# Patient Record
Sex: FEMALE
[De-identification: names, ages, dates, MRNs, and addresses within clinical notes are randomized per-mention and may not be internally consistent; named-entity substitution may affect disease eponyms.]

---

## 2017-02-16 ENCOUNTER — MEDICATION RENEWAL (OUTPATIENT)
Age: 37
End: 2017-02-16

## 2017-02-17 ENCOUNTER — MEDICATION RENEWAL (OUTPATIENT)
Age: 37
End: 2017-02-17

## 2017-07-17 ENCOUNTER — APPOINTMENT (OUTPATIENT)
Dept: ORTHOPEDIC SURGERY | Facility: CLINIC | Age: 37
End: 2017-07-17

## 2017-07-17 VITALS
BODY MASS INDEX: 40.75 KG/M2 | HEART RATE: 86 BPM | DIASTOLIC BLOOD PRESSURE: 79 MMHG | WEIGHT: 230 LBS | HEIGHT: 63 IN | SYSTOLIC BLOOD PRESSURE: 121 MMHG

## 2017-08-25 ENCOUNTER — TRANSCRIPTION ENCOUNTER (OUTPATIENT)
Age: 37
End: 2017-08-25

## 2017-08-28 ENCOUNTER — RESULT REVIEW (OUTPATIENT)
Age: 37
End: 2017-08-28

## 2017-08-28 ENCOUNTER — TRANSCRIPTION ENCOUNTER (OUTPATIENT)
Age: 37
End: 2017-08-28

## 2017-09-11 ENCOUNTER — RX RENEWAL (OUTPATIENT)
Age: 37
End: 2017-09-11

## 2018-09-17 ENCOUNTER — APPOINTMENT (OUTPATIENT)
Dept: ORTHOPEDIC SURGERY | Facility: CLINIC | Age: 38
End: 2018-09-17
Payer: COMMERCIAL

## 2018-09-17 VITALS
HEART RATE: 94 BPM | WEIGHT: 230 LBS | DIASTOLIC BLOOD PRESSURE: 78 MMHG | SYSTOLIC BLOOD PRESSURE: 115 MMHG | HEIGHT: 63 IN | BODY MASS INDEX: 40.75 KG/M2

## 2018-09-17 DIAGNOSIS — M54.16 RADICULOPATHY, LUMBAR REGION: ICD-10-CM

## 2018-09-17 DIAGNOSIS — M25.552 PAIN IN LEFT HIP: ICD-10-CM

## 2018-09-17 DIAGNOSIS — M51.36 OTHER INTERVERTEBRAL DISC DEGENERATION, LUMBAR REGION: ICD-10-CM

## 2018-09-17 PROCEDURE — 99215 OFFICE O/P EST HI 40 MIN: CPT

## 2018-09-17 PROCEDURE — 72100 X-RAY EXAM L-S SPINE 2/3 VWS: CPT

## 2019-10-12 ENCOUNTER — APPOINTMENT (OUTPATIENT)
Dept: ORTHOPEDIC SURGERY | Facility: CLINIC | Age: 39
End: 2019-10-12
Payer: COMMERCIAL

## 2019-10-12 VITALS
SYSTOLIC BLOOD PRESSURE: 135 MMHG | HEIGHT: 63 IN | HEART RATE: 93 BPM | BODY MASS INDEX: 40.75 KG/M2 | WEIGHT: 230 LBS | DIASTOLIC BLOOD PRESSURE: 69 MMHG

## 2019-10-12 DIAGNOSIS — M67.979 UNSPECIFIED DISORDER OF SYNOVIUM AND TENDON, UNSPECIFIED ANKLE AND FOOT: ICD-10-CM

## 2019-10-12 PROCEDURE — 99213 OFFICE O/P EST LOW 20 MIN: CPT

## 2019-10-12 PROCEDURE — 73630 X-RAY EXAM OF FOOT: CPT | Mod: 50

## 2019-10-12 RX ORDER — CYCLOBENZAPRINE HYDROCHLORIDE 10 MG/1
10 TABLET, FILM COATED ORAL
Qty: 7 | Refills: 0 | Status: DISCONTINUED | COMMUNITY
Start: 2017-04-26 | End: 2019-10-12

## 2019-10-12 RX ORDER — MISOPROSTOL 200 UG/1
200 TABLET ORAL
Qty: 60 | Refills: 1 | Status: DISCONTINUED | COMMUNITY
Start: 2018-09-17 | End: 2019-10-12

## 2019-10-12 RX ORDER — CYCLOBENZAPRINE HYDROCHLORIDE 5 MG/1
5 TABLET, FILM COATED ORAL
Qty: 14 | Refills: 0 | Status: DISCONTINUED | COMMUNITY
Start: 2018-09-15 | End: 2019-10-12

## 2019-10-12 RX ORDER — METHYLPREDNISOLONE 4 MG/1
4 TABLET ORAL
Qty: 21 | Refills: 0 | Status: DISCONTINUED | COMMUNITY
Start: 2017-04-26 | End: 2019-10-12

## 2019-10-12 RX ORDER — MELOXICAM 7.5 MG/1
7.5 TABLET ORAL DAILY
Qty: 60 | Refills: 1 | Status: DISCONTINUED | COMMUNITY
Start: 2017-07-17 | End: 2019-10-12

## 2019-10-12 RX ORDER — DICLOFENAC SODIUM 75 MG/1
75 TABLET, DELAYED RELEASE ORAL
Qty: 2 | Refills: 1 | Status: DISCONTINUED | COMMUNITY
Start: 2018-09-17 | End: 2019-10-12

## 2019-10-12 RX ORDER — IBUPROFEN 400 MG/1
400 TABLET, FILM COATED ORAL
Qty: 20 | Refills: 0 | Status: DISCONTINUED | COMMUNITY
Start: 2018-09-14 | End: 2019-10-12

## 2019-10-12 NOTE — HISTORY OF PRESENT ILLNESS
[de-identified] : This is a 39 y.o. Female who presents with c/o bilateral heel pain, left more than right, for about a year, worse over the last 3 months.  Pain is worst first thing in the morning when patient gets out of bed.  There is also significant pain when getting out of a chair, after being seated for a length of time. Pain is described as aching and throbbing. Now the feet are hurting all of the time.  They are aching in bed at night. 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 Tried inserts and changed shoes. Advil helps a little. She is rolling a frozen bottle of water under her feet.  The pain is unbearable at this point.  She has had no formal treatment. \par

## 2019-10-12 NOTE — PHYSICAL EXAM
[Normal RLE] : Right Lower Extremity: No scars, rashes, lesions, ulcers, skin intact [Normal LLE] : Left Lower Extremity: No scars, rashes, lesions, ulcers, skin intact [Normal] : Oriented to person, place, and time, insight and judgement were intact and the affect was normal [de-identified] : Bilateral Feet:\par pes planus/splayfoot deformity\par Pain with passive dorsiflexion left more than right\par + equinus bilaterally\par + tenderness at plantar medial left heel, plantar mid heel on right\par + tenderness along course of left tibialis posterior tendon\par + pain with single leg heel raise on left\par 5/5 motor\par sensation intact to LT\par brisk capillary refill all digits\par   [de-identified] : no swelling, no edema, skin warm and well-perfused  [de-identified] : overweight [de-identified] : .pulmonary [de-identified] : X-rays were obtained of both feet with weight-bearing views reveal pes planus with splaying of the forefoot

## 2019-10-12 NOTE — DISCUSSION/SUMMARY
[de-identified] : X-rays were reviewed with patient. The pathophysiology and anatomy were reviewed in detail with the patient, who expressed understanding. Stretching, as discussed in office. Discussed the role of dorsiflexion night splint, physical therapy, aggressive stretching, proper shoe wear. Patient was fitted into a dorsiflexion night splint.  She will try one splint at this point.  She is given an Rx for PT and an Rx for Diclofenac was sent to her pharmacy.  She will f/u with Dr. Mata in 1 month.\par \par

## 2019-10-15 ENCOUNTER — TRANSCRIPTION ENCOUNTER (OUTPATIENT)
Age: 39
End: 2019-10-15

## 2019-11-04 ENCOUNTER — APPOINTMENT (OUTPATIENT)
Dept: ORTHOPEDIC SURGERY | Facility: CLINIC | Age: 39
End: 2019-11-04

## 2019-11-13 ENCOUNTER — APPOINTMENT (OUTPATIENT)
Dept: ORTHOPEDIC SURGERY | Facility: CLINIC | Age: 39
End: 2019-11-13
Payer: COMMERCIAL

## 2019-11-13 PROCEDURE — 99214 OFFICE O/P EST MOD 30 MIN: CPT

## 2019-12-23 ENCOUNTER — APPOINTMENT (OUTPATIENT)
Dept: ORTHOPEDIC SURGERY | Facility: CLINIC | Age: 39
End: 2019-12-23
Payer: COMMERCIAL

## 2019-12-23 DIAGNOSIS — M72.2 PLANTAR FASCIAL FIBROMATOSIS: ICD-10-CM

## 2019-12-23 DIAGNOSIS — M79.671 PAIN IN RIGHT FOOT: ICD-10-CM

## 2019-12-23 DIAGNOSIS — M79.672 PAIN IN RIGHT FOOT: ICD-10-CM

## 2019-12-23 DIAGNOSIS — M62.89 OTHER SPECIFIED DISORDERS OF MUSCLE: ICD-10-CM

## 2019-12-23 PROCEDURE — 99213 OFFICE O/P EST LOW 20 MIN: CPT | Mod: 25

## 2019-12-23 PROCEDURE — 20550 NJX 1 TENDON SHEATH/LIGAMENT: CPT | Mod: LT

## 2020-01-13 ENCOUNTER — APPOINTMENT (OUTPATIENT)
Dept: INTERNAL MEDICINE | Facility: CLINIC | Age: 40
End: 2020-01-13

## 2020-02-18 ENCOUNTER — APPOINTMENT (OUTPATIENT)
Dept: ORTHOPEDIC SURGERY | Facility: CLINIC | Age: 40
End: 2020-02-18

## 2020-05-05 ENCOUNTER — APPOINTMENT (OUTPATIENT)
Dept: INTERNAL MEDICINE | Facility: CLINIC | Age: 40
End: 2020-05-05

## 2021-10-07 ENCOUNTER — NON-APPOINTMENT (OUTPATIENT)
Age: 41
End: 2021-10-07

## 2021-10-11 ENCOUNTER — APPOINTMENT (OUTPATIENT)
Dept: COLORECTAL SURGERY | Facility: CLINIC | Age: 41
End: 2021-10-11
Payer: COMMERCIAL

## 2021-10-11 VITALS
SYSTOLIC BLOOD PRESSURE: 117 MMHG | OXYGEN SATURATION: 99 % | HEART RATE: 89 BPM | RESPIRATION RATE: 16 BRPM | DIASTOLIC BLOOD PRESSURE: 77 MMHG | TEMPERATURE: 98 F

## 2021-10-11 DIAGNOSIS — R19.09 OTHER INTRA-ABDOMINAL AND PELVIC SWELLING, MASS AND LUMP: ICD-10-CM

## 2021-10-11 PROCEDURE — 99205 OFFICE O/P NEW HI 60 MIN: CPT

## 2021-10-11 RX ORDER — DICLOFENAC SODIUM 75 MG/1
75 TABLET, DELAYED RELEASE ORAL TWICE DAILY
Qty: 1 | Refills: 0 | Status: DISCONTINUED | COMMUNITY
Start: 2019-10-12 | End: 2021-10-11

## 2021-10-11 RX ORDER — METHOCARBAMOL 500 MG/1
500 TABLET, FILM COATED ORAL
Qty: 20 | Refills: 0 | Status: ACTIVE | COMMUNITY
Start: 2021-10-03

## 2021-10-11 RX ORDER — OXYCODONE 5 MG/1
5 TABLET ORAL
Qty: 12 | Refills: 0 | Status: ACTIVE | COMMUNITY
Start: 2021-10-06

## 2021-10-11 RX ORDER — OMEPRAZOLE 40 MG/1
40 CAPSULE, DELAYED RELEASE ORAL
Qty: 30 | Refills: 0 | Status: ACTIVE | COMMUNITY
Start: 2021-09-03

## 2021-10-11 RX ORDER — ONDANSETRON 4 MG/1
4 TABLET, ORALLY DISINTEGRATING ORAL
Qty: 5 | Refills: 0 | Status: DISCONTINUED | COMMUNITY
Start: 2021-09-16

## 2021-10-11 RX ORDER — CELECOXIB 200 MG/1
200 CAPSULE ORAL
Qty: 6 | Refills: 0 | Status: DISCONTINUED | COMMUNITY
Start: 2021-09-14

## 2021-10-11 RX ORDER — GABAPENTIN 300 MG/1
300 CAPSULE ORAL
Qty: 9 | Refills: 0 | Status: DISCONTINUED | COMMUNITY
Start: 2021-09-14

## 2021-10-11 RX ORDER — MECLIZINE HYDROCHLORIDE 25 MG/1
25 TABLET ORAL
Qty: 7 | Refills: 0 | Status: DISCONTINUED | COMMUNITY
Start: 2017-06-13 | End: 2021-10-11

## 2021-10-11 RX ORDER — PROCHLORPERAZINE MALEATE 5 MG/1
5 TABLET ORAL
Qty: 15 | Refills: 0 | Status: DISCONTINUED | COMMUNITY
Start: 2021-09-03

## 2021-10-11 RX ORDER — ENOXAPARIN SODIUM 100 MG/ML
30 INJECTION SUBCUTANEOUS
Qty: 8 | Refills: 0 | Status: DISCONTINUED | COMMUNITY
Start: 2021-09-03

## 2021-10-11 RX ORDER — DICLOFENAC SODIUM 10 MG/G
1 GEL TOPICAL DAILY
Qty: 1 | Refills: 1 | Status: DISCONTINUED | COMMUNITY
Start: 2019-11-13 | End: 2021-10-11

## 2021-10-11 RX ORDER — ESCITALOPRAM OXALATE 20 MG/1
20 TABLET ORAL
Qty: 90 | Refills: 0 | Status: DISCONTINUED | COMMUNITY
Start: 2021-04-15

## 2021-10-11 RX ORDER — HYDROXYZINE HYDROCHLORIDE 10 MG/1
10 TABLET ORAL
Qty: 120 | Refills: 0 | Status: DISCONTINUED | COMMUNITY
Start: 2021-01-27

## 2021-10-11 NOTE — PHYSICAL EXAM
[Normal Breath Sounds] : Normal breath sounds [Normal Heart Sounds] : normal heart sounds [Normal Rate and Rhythm] : normal rate and rhythm [Oriented to Person] : oriented to person [Alert] : alert [Oriented to Place] : oriented to place [Oriented to Time] : oriented to time [Anxious] : anxious [Abdomen Masses] : No abdominal masses [Tender] : nontender [Exam Deferred] : exam was deferred [Purpura] : no purpura  [Petechiae] : no petechiae [Skin Ulcer] : no ulcer [Skin Induration] : no induration [de-identified] : round +BS , Pain at left upper quadrant trocar site, no evidence of hernia or infection [de-identified] : normal female [de-identified] : NC/AT [de-identified] : +ROM [de-identified] : intact

## 2021-10-11 NOTE — HISTORY OF PRESENT ILLNESS
[FreeTextEntry1] : 40yo F pt presents with left side abdominal pain and tailgut cyst finding on CT. 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 Pt's left abd pain occurs positionally, denies difficulty defecating, pain, bleeding. Pt has daily BM.\par

## 2021-10-11 NOTE — ASSESSMENT
[FreeTextEntry1] : 41-year-old female status post recent bariatric surgery Incidentally found to have a 10.5 cm presacral mass on CAT scan for left upper quadrant pain. She's never had a colonoscopy. Has no difficulty with bowel movements.

## 2021-11-01 ENCOUNTER — APPOINTMENT (OUTPATIENT)
Dept: MRI IMAGING | Facility: CLINIC | Age: 41
End: 2021-11-01

## 2021-12-09 ENCOUNTER — APPOINTMENT (OUTPATIENT)
Dept: COLORECTAL SURGERY | Facility: CLINIC | Age: 41
End: 2021-12-09

## 2023-03-09 ENCOUNTER — NON-APPOINTMENT (OUTPATIENT)
Age: 43
End: 2023-03-09

## 2023-04-15 ENCOUNTER — NON-APPOINTMENT (OUTPATIENT)
Age: 43
End: 2023-04-15

## 2023-11-08 ENCOUNTER — NON-APPOINTMENT (OUTPATIENT)
Age: 43
End: 2023-11-08

## 2023-11-23 ENCOUNTER — NON-APPOINTMENT (OUTPATIENT)
Age: 43
End: 2023-11-23

## 2024-05-13 ENCOUNTER — NON-APPOINTMENT (OUTPATIENT)
Age: 44
End: 2024-05-13

## 2025-01-30 ENCOUNTER — RESULT REVIEW (OUTPATIENT)
Age: 45
End: 2025-01-30

## 2025-02-18 ENCOUNTER — OUTPATIENT (OUTPATIENT)
Dept: OUTPATIENT SERVICES | Facility: HOSPITAL | Age: 45
LOS: 1 days | End: 2025-02-18
Payer: COMMERCIAL

## 2025-02-18 VITALS
WEIGHT: 192.02 LBS | OXYGEN SATURATION: 100 % | TEMPERATURE: 98 F | HEIGHT: 63 IN | DIASTOLIC BLOOD PRESSURE: 78 MMHG | HEART RATE: 65 BPM | RESPIRATION RATE: 16 BRPM | SYSTOLIC BLOOD PRESSURE: 139 MMHG

## 2025-02-18 DIAGNOSIS — Z30.430 ENCOUNTER FOR INSERTION OF INTRAUTERINE CONTRACEPTIVE DEVICE: ICD-10-CM

## 2025-02-18 DIAGNOSIS — Z01.818 ENCOUNTER FOR OTHER PREPROCEDURAL EXAMINATION: ICD-10-CM

## 2025-02-18 DIAGNOSIS — Z98.891 HISTORY OF UTERINE SCAR FROM PREVIOUS SURGERY: Chronic | ICD-10-CM

## 2025-02-18 DIAGNOSIS — Z98.84 BARIATRIC SURGERY STATUS: Chronic | ICD-10-CM

## 2025-02-18 LAB
HCT VFR BLD CALC: 32.4 % — LOW (ref 34.5–45)
HGB BLD-MCNC: 8.8 G/DL — LOW (ref 11.5–15.5)
MCHC RBC-ENTMCNC: 19.7 PG — LOW (ref 27–34)
MCHC RBC-ENTMCNC: 27.2 G/DL — LOW (ref 32–36)
MCV RBC AUTO: 72.5 FL — LOW (ref 80–100)
NRBC BLD AUTO-RTO: 0 /100 WBCS — SIGNIFICANT CHANGE UP (ref 0–0)
PLATELET # BLD AUTO: 317 K/UL — SIGNIFICANT CHANGE UP (ref 150–400)
RBC # BLD: 4.47 M/UL — SIGNIFICANT CHANGE UP (ref 3.8–5.2)
RBC # FLD: 16.1 % — HIGH (ref 10.3–14.5)
WBC # BLD: 6.26 K/UL — SIGNIFICANT CHANGE UP (ref 3.8–10.5)
WBC # FLD AUTO: 6.26 K/UL — SIGNIFICANT CHANGE UP (ref 3.8–10.5)

## 2025-02-18 PROCEDURE — 85027 COMPLETE CBC AUTOMATED: CPT

## 2025-02-18 PROCEDURE — G0463: CPT

## 2025-02-18 RX ORDER — LIDOCAINE HCL/PF 10 MG/ML
0.2 VIAL (ML) INJECTION ONCE
Refills: 0 | Status: DISCONTINUED | OUTPATIENT
Start: 2025-02-25 | End: 2025-03-11

## 2025-02-18 RX ORDER — SODIUM CHLORIDE 9 G/1000ML
1000 INJECTION, SOLUTION INTRAVENOUS
Refills: 0 | Status: DISCONTINUED | OUTPATIENT
Start: 2025-02-25 | End: 2025-03-11

## 2025-02-18 RX ORDER — OMEPRAZOLE 20 MG/1
1 CAPSULE, DELAYED RELEASE ORAL
Refills: 0 | DISCHARGE

## 2025-02-18 RX ORDER — METHOCARBAMOL 500 MG/1
2 TABLET, FILM COATED ORAL
Refills: 0 | DISCHARGE

## 2025-02-18 NOTE — H&P PST ADULT - HISTORY OF PRESENT ILLNESS
44 year old female  Presenting to PST prior to scheduled Insertion of Mirena IUD on 2/25/25 with Dr. Gonzalez.  44 year old female Hx Bariatric surgery       Presenting to PST prior to scheduled Insertion of Mirena IUD on 2/25/25 with Dr. Gonzalez.  44 year old female Hx Bariatric surgery, Colon mass resection.   Presenting to PST prior to scheduled Insertion of Mirena IUD on 2/25/25 with Dr. Gonzalez. Patient endorses she had pap smear last week, due to anatomy,  MD opting ambi,  IUD insertion    LMP 2/9/25

## 2025-02-18 NOTE — H&P PST ADULT - MUSCULOSKELETAL
negative no joint swelling/no calf tenderness/strength 5/5 bilateral upper extremities/strength 5/5 bilateral lower extremities

## 2025-02-18 NOTE — H&P PST ADULT - NSANTHOSAYNRD_GEN_A_CORE
Hx SHI; s/p Weight loss sx; Lost 90 pounds./No. SHI screening performed.  STOP BANG Legend: 0-2 = LOW Risk; 3-4 = INTERMEDIATE Risk; 5-8 = HIGH Risk

## 2025-02-18 NOTE — H&P PST ADULT - ATTENDING COMMENTS
Presents for IUD insertion secondary to difficult visualization in the office. No acute complaints.     VSS    Risks, benefits and alternatives previously reviewed, explained again. all questions answered and consent signed.

## 2025-02-18 NOTE — H&P PST ADULT - PROBLEM SELECTOR PLAN 1
Scheduled for  Mirena IUD insertion  Pre-operative instructions given.  Labs: cbc, bmp drawn in PST  DOS: Urine pregnancy

## 2025-02-18 NOTE — H&P PST ADULT - ASSESSMENT
DASI score: 7.23  DASI activity: Walking  10 steps daily.  Loose teeth or denture: Upper crown removal)

## 2025-02-18 NOTE — H&P PST ADULT - NSICDXPASTSURGICALHX_GEN_ALL_CORE_FT
PAST SURGICAL HISTORY:  H/O bariatric surgery     H/O  section      PAST SURGICAL HISTORY:  H/O bariatric surgery     H/O partial resection of colon     History of 2  sections

## 2025-02-24 NOTE — H&P PST ADULT - NSICDXPASTMEDICALHX_GEN_ALL_CORE_FT
Medication: Remeron  Last office visit date: 1/6/2025  Medication Refill Protocol passed.     
PAST MEDICAL HISTORY:  History of gestational diabetes

## 2025-02-25 ENCOUNTER — TRANSCRIPTION ENCOUNTER (OUTPATIENT)
Age: 45
End: 2025-02-25

## 2025-02-25 ENCOUNTER — OUTPATIENT (OUTPATIENT)
Dept: OUTPATIENT SERVICES | Facility: HOSPITAL | Age: 45
LOS: 1 days | End: 2025-02-25
Payer: COMMERCIAL

## 2025-02-25 VITALS
OXYGEN SATURATION: 97 % | RESPIRATION RATE: 16 BRPM | TEMPERATURE: 98 F | DIASTOLIC BLOOD PRESSURE: 56 MMHG | HEART RATE: 77 BPM | SYSTOLIC BLOOD PRESSURE: 125 MMHG

## 2025-02-25 VITALS
DIASTOLIC BLOOD PRESSURE: 84 MMHG | SYSTOLIC BLOOD PRESSURE: 146 MMHG | HEART RATE: 75 BPM | WEIGHT: 192.02 LBS | RESPIRATION RATE: 16 BRPM | TEMPERATURE: 97 F | HEIGHT: 63 IN | OXYGEN SATURATION: 96 %

## 2025-02-25 DIAGNOSIS — Z98.891 HISTORY OF UTERINE SCAR FROM PREVIOUS SURGERY: Chronic | ICD-10-CM

## 2025-02-25 DIAGNOSIS — Z98.84 BARIATRIC SURGERY STATUS: Chronic | ICD-10-CM

## 2025-02-25 DIAGNOSIS — Z30.430 ENCOUNTER FOR INSERTION OF INTRAUTERINE CONTRACEPTIVE DEVICE: ICD-10-CM

## 2025-02-25 PROCEDURE — 58300 INSERT INTRAUTERINE DEVICE: CPT

## 2025-02-25 DEVICE — BIRTH CONTROL IUD MIRENA: Type: IMPLANTABLE DEVICE | Status: FUNCTIONAL

## 2025-02-25 RX ORDER — FENTANYL CITRATE-0.9 % NACL/PF 100MCG/2ML
25 SYRINGE (ML) INTRAVENOUS
Refills: 0 | Status: DISCONTINUED | OUTPATIENT
Start: 2025-02-25 | End: 2025-02-25

## 2025-02-25 RX ORDER — ONDANSETRON HCL/PF 4 MG/2 ML
4 VIAL (ML) INJECTION ONCE
Refills: 0 | Status: DISCONTINUED | OUTPATIENT
Start: 2025-02-25 | End: 2025-03-11

## 2025-02-25 RX ORDER — B1/B2/B3/B5/B6/B12/VIT C/FOLIC 500-0.5 MG
1 TABLET ORAL
Refills: 0 | DISCHARGE

## 2025-02-25 RX ADMIN — SODIUM CHLORIDE 100 MILLILITER(S): 9 INJECTION, SOLUTION INTRAVENOUS at 07:08

## 2025-02-25 NOTE — PRE-ANESTHESIA EVALUATION ADULT - NSPROPOSEDPROCEDFT_GEN_ALL_CORE
Detail Level: Zone Continue Regimen: Eucrisa BID\\nFluocinolone PRN flare up Plan: 1. Before bedtime, soak the wart 10 minutes in tepid water and pat dry with a towel. You may substitute a shower or bath for this soak. \\n\\n2. Carefully apply a small amount of Dr. Fan's wart remover to the surface of the wart with a toothpick.\\n\\n3. Cover the wart with adhesive tape. (blenderm, Duct tape, etc.)\\n\\n4. Leave the wart covered overnight. (at least six hours)\\n\\n5. Wash off the wart in the morning with soap and water. The wart, and any other skin the medicine touches, will turn soft and white. \\n\\n6. Repeat steps 1-5 every night for two weeks. \\n\\n7. File off any dead white skin on the seventh morning with a corn file, emery board, or pumice stone after the wart is washed. This is particularly important for thick plantar warts. \\n\\n8. Repeat steps 1-7 for a second week. Do not skip even one night of application- Including weekends, holiday, and overnight trips. \\n\\n9. Wait one week for the skin to heal without treatment. This healing phase is also necessary. \\n\\n10. If the wart is not gone, repeat steps 1-9 for three more cycles if necessary.\\n\\nIMPORTANT: Sometimes warts bleed. Don't be alarmed. It will stop by applying pressure for ten minutes followed with a band-aid. If painful irritation develops, contact Dr. Orta's office at [phone].\\n\\nWARNING: Dr. Fan's wart medicine contains salicylic acid and is TOXIC, as are all wart medicine. Do not get this in the eyes or mouth. Nor should you spread it over large areas of skin. If this happens, wash off any stray medicine with soap and water immediately, call the doctor, and/or go to an emergency room as soon as possible. Do not this medicine on anyone or anything not expressly approved by Dr. Orta. IUD insertion

## 2025-02-25 NOTE — ASU DISCHARGE PLAN (ADULT/PEDIATRIC) - FINANCIAL ASSISTANCE
[facility] provides services at a reduced cost to those who are determined to be eligible through [facility]’s financial assistance program. Information regarding [facility]’s financial assistance program can be found by going to [email or web address] or by calling [phone].

## 2025-02-25 NOTE — PACU DISCHARGE NOTE - NSCLINEINSERTRD_GEN_ALL_CORE
PT IRP Discharge Note    Primary Rehabilitation Diagnosis: Spina Bifida, TKR  Expected Discharge Date: 07/20/18 (no reconference needed.)  Planned Discharge Destination: Home    SUBJECTIVE: Subjective: pt agreeable to PT session (07/20/18 0700)  Subjective/Objective Comments: PT session conducted in gym, pt resting in recliner at end of session with needs met (07/20/18 0700)    OBJECTIVE:  Precautions  Weight Bearing Status: Weight bearing as tolerated right lower extremity (07/16/18 1301)  Precautions Comments: s/p R TKA (07/16/18 1301)    See below for current functional status overview.  See PT flowsheet for full details regarding the PT therapy provided.    ASSESSMENT:   The patient has participated in skilled PT services on IRP and at this time is being discharged to home alone.  The patient’s therapy goals were reassessed this date and the patient has met the goals, with the exception of stoop steps - pt to start OP PT.  The patient’s equipment needs were addressed and the patient is being discharged with no AD (pt has 2 w/w).      Recommendations from PT include OP PT.  The patient would benefit from continued PT to address ROM, strength, edema with a good prognosis to meet future therapy goals. The plan has been discussed with the patient and the patient verbalized agreement.  Discharge inpatient rehabilitation PT.    PT Identified Barriers to Discharge: steps, lives alone     EDUCATION:   On this date, education was provided to patient regarding  written home exercise program, transfers and ambulation  The response to education was/were: Verbalizes understanding and Demonstrates understanding    PLAN:   Continue skilled PT, including the following Treatment/Interventions: Functional transfer training;Strengthening;ROM;Patient/Family training;Equipment eval/education;Bed mobility;Gait training;Continued evaluation;Compensatory technique education;Stairs retraining;Safety Education;Neuromuscular re-education  (07/15/18 1100)   PT Frequency: 7 days/week (07/19/18 1430), Frequency Comments: D/C PT (07/20/18 0700)    Treatment Plan for Next Session: .  Additional Plan Considerations: .       RECOMMENDATIONS FOR DISCHARGE:  Recommendation for Discharge: PT: Home, OP therapy    PT/OT Mobility Equipment for Discharge: has 2 ww and manual w/c, no needs anticipated  (07/17/18 1330)  PT/OT ADL Equipment for Discharge: none (07/19/18 0700)      FUNCTIONAL DATA OVERVIEW LAST 24 HOURS  Bed Mobility   Bed Mobility  Rolling to the Right: Modified Independent (07/19/18 1100)  Rolling to the Left: Modified Independent (07/19/18 1100)  Supine to Sit: Modified Independent (07/20/18 0700)  Sit to Supine: Modified Independent (07/20/18 0700)  Bed Mobility Comments: on/off mat table (07/20/18 0700)    Transfers  Transfers  Sit to Stand: Modified Independent (07/20/18 0700)  Stand to Sit: Modified Independent (07/20/18 0700)  Stand Pivot Transfers: Modified Independent (07/20/18 0700)  Car Transfers: Patient Refused (07/19/18 1100)  Assistive Device/: Gait Belt;2-wheeled walker (07/20/18 0700)  Transfer Comments 1: good sequencing and hand placement  (07/20/18 0700)    Gait  Gait  Gait Assistance: Modified Independent (07/20/18 0700)  Assistive Device/: Gait Belt;2-wheeled walker (07/20/18 0700)  Ambulation Distance (Feet): 50 Feet (07/20/18 0700)  Walk 10 feet: Modified Independent (07/20/18 0700)  Walk 50 feet with 2 turns: Modified Independent (07/20/18 0700)  Walk 150 feet: Modified Independent (07/19/18 1100)   small object from floor: Not attempted due to safety concerns (07/19/18 1100)  Pattern: Decreased stance time R (07/20/18 0700)  Ambulation Surface: Tile (07/20/18 0700)  Gait Comments 1: good sequencing over level surfaces, increased time and decreased R knee flexion in swing phase (07/20/18 0700)  Gait Comments 2: distance not challenged this date (07/20/18 0700),      Stairs  Stairs Mobility  1  No step (curb): Patient Refused (07/20/18 0700)  4 steps: Patient Refused (07/20/18 0700)  12 steps: Patient Refused (07/20/18 0700)    Wheelchair Mobility       Balance  Balance  Standing - Static: Modified Independent (07/20/18 0700)  Standing - Dynamic (eyes open): Supervision (Supv) (07/20/18 0700)  Balance Comments #1: with BUE propping  (07/20/18 0700)    Neuromuscular Re-education  Neuromuscular Re-education  Neuromuscular Re-education 1: sidstepping 8'x4B, standing with fading UE suppoert x3' for balance training  (07/20/18 0700)

## 2025-02-25 NOTE — BRIEF OPERATIVE NOTE - OPERATION/FINDINGS
Normal appearing vagina and external genitalia. Normal appearing cervix. Sounded to 9cm, IUD placed without difficulty.

## 2025-02-25 NOTE — ASU DISCHARGE PLAN (ADULT/PEDIATRIC) - CARE PROVIDER_API CALL
Amanda Gonzalez  Obstetrics and Gynecology  [address], [address]  [place] [address]  Phone: [phone]  Fax: [phone]  Established Patient  Follow Up Time:

## 2025-05-15 ENCOUNTER — NON-APPOINTMENT (OUTPATIENT)
Age: 45
End: 2025-05-15

## (undated) DEVICE — OS FINDER

## (undated) DEVICE — GLV 6.5 PROTEXIS (WHITE)

## (undated) DEVICE — WARMING BLANKET UPPER ADULT

## (undated) DEVICE — DRAPE 1/2 SHEET 40X57"

## (undated) DEVICE — BULB SYRINGE 60CC

## (undated) DEVICE — POSITIONER FOAM EGG CRATE ULNAR 2PCS (PINK)

## (undated) DEVICE — PACK LITHOTOMY

## (undated) DEVICE — PREP BETADINE KIT

## (undated) DEVICE — SOL IRR POUR NS 0.9% 500ML

## (undated) DEVICE — FOLEY CATH 2-WAY 16FR 5CC LATEX LUBRICATH